# Patient Record
Sex: FEMALE | Race: BLACK OR AFRICAN AMERICAN | NOT HISPANIC OR LATINO | URBAN - NONMETROPOLITAN AREA
[De-identification: names, ages, dates, MRNs, and addresses within clinical notes are randomized per-mention and may not be internally consistent; named-entity substitution may affect disease eponyms.]

---

## 2022-09-15 ENCOUNTER — TELEPHONE ENCOUNTER (OUTPATIENT)
Dept: URBAN - NONMETROPOLITAN AREA CLINIC 2 | Facility: CLINIC | Age: 77
End: 2022-09-15

## 2022-10-06 ENCOUNTER — WEB ENCOUNTER (OUTPATIENT)
Dept: URBAN - NONMETROPOLITAN AREA CLINIC 13 | Facility: CLINIC | Age: 77
End: 2022-10-06

## 2022-10-06 ENCOUNTER — OFFICE VISIT (OUTPATIENT)
Dept: URBAN - NONMETROPOLITAN AREA CLINIC 13 | Facility: CLINIC | Age: 77
End: 2022-10-06
Payer: MEDICARE

## 2022-10-06 ENCOUNTER — LAB OUTSIDE AN ENCOUNTER (OUTPATIENT)
Dept: URBAN - NONMETROPOLITAN AREA CLINIC 13 | Facility: CLINIC | Age: 77
End: 2022-10-06

## 2022-10-06 VITALS
BODY MASS INDEX: 22.44 KG/M2 | HEART RATE: 88 BPM | HEIGHT: 67 IN | DIASTOLIC BLOOD PRESSURE: 84 MMHG | WEIGHT: 143 LBS | SYSTOLIC BLOOD PRESSURE: 161 MMHG

## 2022-10-06 DIAGNOSIS — Z80.0 FAMILY HISTORY OF COLON CANCER: ICD-10-CM

## 2022-10-06 DIAGNOSIS — K59.04 CHRONIC IDIOPATHIC CONSTIPATION: ICD-10-CM

## 2022-10-06 DIAGNOSIS — Z12.11 COLON CANCER SCREENING: ICD-10-CM

## 2022-10-06 PROCEDURE — 99203 OFFICE O/P NEW LOW 30 MIN: CPT | Performed by: NURSE PRACTITIONER

## 2022-10-06 NOTE — HPI-OTHER HISTORIES
Ms. Irish Monaco is a 73 year old female here for indigestion. She has had this issue a few years ago with a normal EGD in 2012. She is very healthy and does not want to take medications if she does not needed. She has found that food higher in fat causing indigestion. At night when she first lays down and in the morning she has symptoms. She has changed her diet, lifted the head of her bed, and does not eat late. She continues to have symptoms. She denies any weight changes. She denies any blood in her stool or melena.   2/13/19 EGD showed mild non-severe stenosis. This ws not dilated due to lack of reported dysphagia. No signs of Gorman's. Small hernia present. Today she feels great. She just uses occassional PRN meds for reflux. She denies any other symptoms. SHe does not have dyshagia. She is very active. She walks nad does [employer] classes at the [facility]

## 2022-10-06 NOTE — HPI-TODAY'S VISIT:
10/6/2022 Ms. Irish Monaco is a 77 year old female here for constipation. She was last seen in 2019 for reflux. She has a hx of polyp. Her last colonoscopy was 2017. She is due 2022. She has constipation only about once a month and uses warm milk to get her bowels going. She denies any blood in stool or melena. Her reflux is well controlled. overall, she feels well. CS

## 2022-12-07 ENCOUNTER — CLAIMS CREATED FROM THE CLAIM WINDOW (OUTPATIENT)
Dept: URBAN - NONMETROPOLITAN AREA SURGERY CENTER 1 | Facility: SURGERY CENTER | Age: 77
End: 2022-12-07

## 2022-12-07 ENCOUNTER — CLAIMS CREATED FROM THE CLAIM WINDOW (OUTPATIENT)
Dept: URBAN - NONMETROPOLITAN AREA SURGERY CENTER 1 | Facility: SURGERY CENTER | Age: 77
End: 2022-12-07
Payer: MEDICARE

## 2022-12-07 DIAGNOSIS — Z86.010 ADENOMAS PERSONAL HISTORY OF COLONIC POLYPS: ICD-10-CM

## 2022-12-07 DIAGNOSIS — Z53.8 FAILED ATTEMPTED SURGICAL PROCEDURE: ICD-10-CM

## 2022-12-07 PROCEDURE — G0105 COLORECTAL SCRN; HI RISK IND: HCPCS | Performed by: INTERNAL MEDICINE

## 2022-12-07 PROCEDURE — G8907 PT DOC NO EVENTS ON DISCHARG: HCPCS | Performed by: INTERNAL MEDICINE

## 2023-01-05 ENCOUNTER — TELEPHONE ENCOUNTER (OUTPATIENT)
Dept: URBAN - NONMETROPOLITAN AREA CLINIC 2 | Facility: CLINIC | Age: 78
End: 2023-01-05

## 2023-01-25 ENCOUNTER — OFFICE VISIT (OUTPATIENT)
Dept: URBAN - NONMETROPOLITAN AREA CLINIC 2 | Facility: CLINIC | Age: 78
End: 2023-01-25

## 2023-02-23 ENCOUNTER — OFFICE VISIT (OUTPATIENT)
Dept: URBAN - NONMETROPOLITAN AREA CLINIC 13 | Facility: CLINIC | Age: 78
End: 2023-02-23
Payer: MEDICARE

## 2023-02-23 ENCOUNTER — DASHBOARD ENCOUNTERS (OUTPATIENT)
Age: 78
End: 2023-02-23

## 2023-02-23 VITALS
TEMPERATURE: 98.3 F | BODY MASS INDEX: 22.29 KG/M2 | WEIGHT: 142 LBS | HEART RATE: 91 BPM | HEIGHT: 67 IN | SYSTOLIC BLOOD PRESSURE: 173 MMHG | DIASTOLIC BLOOD PRESSURE: 67 MMHG

## 2023-02-23 DIAGNOSIS — K59.04 CHRONIC IDIOPATHIC CONSTIPATION: ICD-10-CM

## 2023-02-23 DIAGNOSIS — Z12.11 COLON CANCER SCREENING: ICD-10-CM

## 2023-02-23 DIAGNOSIS — Z80.0 FAMILY HISTORY OF COLON CANCER: ICD-10-CM

## 2023-02-23 PROBLEM — 82934008: Status: ACTIVE | Noted: 2022-10-06

## 2023-02-23 PROCEDURE — 99213 OFFICE O/P EST LOW 20 MIN: CPT | Performed by: NURSE PRACTITIONER

## 2023-02-23 NOTE — HPI-TODAY'S VISIT:
2/23/2023 Ms. Irish Monaco is here for f/u of constipation and colonoscopy. She has a hx of polyp. Her last colonoscopy was 2017 without any polyps. She has a family hx of colon cancer. At her last OV, she was having some minor constipation once a month and due for colon screening. She had a colonoscopy that was incomplete due to redunant colon. It was recommended for her to have a f/u FIT or Cologuard. She declines. She understands the risk and does not wish to proceed with any further testing. her constipation has resovled with increased dietary fiber and water. overall, she feels well. CS

## 2023-02-23 NOTE — HPI-OTHER HISTORIES
Ms. Irish Monaco is a 73 year old female here for indigestion. She has had this issue a few years ago with a normal EGD in 2012. She is very healthy and does not want to take medications if she does not needed. She has found that food higher in fat causing indigestion. At night when she first lays down and in the morning she has symptoms. She has changed her diet, lifted the head of her bed, and does not eat late. She continues to have symptoms. She denies any weight changes. She denies any blood in her stool or melena. CS  2/13/19 EGD showed mild non-severe stenosis. This ws not dilated due to lack of reported dysphagia. No signs of Gorman's. Small hernia present. Today she feels great. She just uses occassional PRN meds for reflux. She denies any other symptoms. SHe does not have dyshagia. She is very active. She walks nad does [employer] classes at the [facility]  10/6/2022 Ms. Irish Monaco is a 77 year old female here for constipation. She was last seen in 2019 for reflux. She has a hx of polyp. Her last colonoscopy was 2017. She is due 2022. She has constipation only about once a month and uses warm milk to get her bowels going. She denies any blood in stool or melena. Her reflux is well controlled. overall, she feels well. CS  12/7/2022 Colonoscopy: leftsided diverticulosis and recto-sigmoid redundency preventing passage of scope despite multiple attempts.